# Patient Record
Sex: MALE | Race: WHITE | Employment: OTHER | URBAN - METROPOLITAN AREA
[De-identification: names, ages, dates, MRNs, and addresses within clinical notes are randomized per-mention and may not be internally consistent; named-entity substitution may affect disease eponyms.]

---

## 2017-03-16 ENCOUNTER — OFFICE VISIT (OUTPATIENT)
Dept: PULMONOLOGY | Age: 62
End: 2017-03-16
Payer: COMMERCIAL

## 2017-03-16 VITALS
BODY MASS INDEX: 25.48 KG/M2 | WEIGHT: 172 LBS | DIASTOLIC BLOOD PRESSURE: 80 MMHG | RESPIRATION RATE: 20 BRPM | OXYGEN SATURATION: 98 % | SYSTOLIC BLOOD PRESSURE: 143 MMHG | HEIGHT: 69 IN | TEMPERATURE: 97.1 F | HEART RATE: 85 BPM

## 2017-03-16 DIAGNOSIS — I26.99 OTHER PULMONARY EMBOLISM WITHOUT ACUTE COR PULMONALE, UNSPECIFIED CHRONICITY (HCC): Primary | ICD-10-CM

## 2017-03-16 PROCEDURE — 3017F COLORECTAL CA SCREEN DOC REV: CPT | Performed by: INTERNAL MEDICINE

## 2017-03-16 PROCEDURE — 99214 OFFICE O/P EST MOD 30 MIN: CPT | Performed by: INTERNAL MEDICINE

## 2017-03-16 PROCEDURE — G8419 CALC BMI OUT NRM PARAM NOF/U: HCPCS | Performed by: INTERNAL MEDICINE

## 2017-03-16 PROCEDURE — 1036F TOBACCO NON-USER: CPT | Performed by: INTERNAL MEDICINE

## 2017-03-16 PROCEDURE — G8484 FLU IMMUNIZE NO ADMIN: HCPCS | Performed by: INTERNAL MEDICINE

## 2017-03-16 PROCEDURE — G8427 DOCREV CUR MEDS BY ELIG CLIN: HCPCS | Performed by: INTERNAL MEDICINE

## 2017-09-05 DIAGNOSIS — I26.09 OTHER PULMONARY EMBOLISM WITH ACUTE COR PULMONALE (HCC): ICD-10-CM

## 2018-08-02 ENCOUNTER — OFFICE VISIT (OUTPATIENT)
Dept: ONCOLOGY | Age: 63
End: 2018-08-02
Payer: COMMERCIAL

## 2018-08-02 VITALS
HEART RATE: 87 BPM | WEIGHT: 182 LBS | DIASTOLIC BLOOD PRESSURE: 93 MMHG | BODY MASS INDEX: 26.96 KG/M2 | RESPIRATION RATE: 20 BRPM | HEIGHT: 69 IN | TEMPERATURE: 98.1 F | SYSTOLIC BLOOD PRESSURE: 156 MMHG

## 2018-08-02 DIAGNOSIS — I10 ESSENTIAL HYPERTENSION: ICD-10-CM

## 2018-08-02 DIAGNOSIS — I26.09 OTHER ACUTE PULMONARY EMBOLISM WITH ACUTE COR PULMONALE (HCC): Primary | ICD-10-CM

## 2018-08-02 DIAGNOSIS — I27.20 PULMONARY HTN (HCC): ICD-10-CM

## 2018-08-02 PROCEDURE — G8419 CALC BMI OUT NRM PARAM NOF/U: HCPCS | Performed by: INTERNAL MEDICINE

## 2018-08-02 PROCEDURE — G8427 DOCREV CUR MEDS BY ELIG CLIN: HCPCS | Performed by: INTERNAL MEDICINE

## 2018-08-02 PROCEDURE — 1036F TOBACCO NON-USER: CPT | Performed by: INTERNAL MEDICINE

## 2018-08-02 PROCEDURE — 3017F COLORECTAL CA SCREEN DOC REV: CPT | Performed by: INTERNAL MEDICINE

## 2018-08-02 PROCEDURE — 99214 OFFICE O/P EST MOD 30 MIN: CPT | Performed by: INTERNAL MEDICINE

## 2018-08-02 ASSESSMENT — ENCOUNTER SYMPTOMS
WHEEZING: 0
COUGH: 0
COLOR CHANGE: 0
VOICE CHANGE: 0
SHORTNESS OF BREATH: 0
ABDOMINAL PAIN: 0
EYE ITCHING: 0
EYE REDNESS: 0
CHEST TIGHTNESS: 0
SORE THROAT: 0
TROUBLE SWALLOWING: 0
EYE DISCHARGE: 0
DIARRHEA: 0
CONSTIPATION: 0
BLOOD IN STOOL: 0
NAUSEA: 0
VOMITING: 0

## 2018-08-02 NOTE — LETTER
MTHFR GENE MUTATION ANALYSIS REPORT  Ronald Borrero Ph.D., F.A.C.M.G. - Director, [facility] Sharon Person MD  Specimen(s) Received:  Peripheral blood  Clinical Information:  Other secondary pulmonary hypertension    RESULTS:  MOLECULAR GENETIC DIAGNOSIS:     A. Heterozygous for the MTHFR 677T  Mutation      B. Negative for MTHFR F8851G Mutation    INTERPRETATION:  Using the methodology described, this patient was  found to be a carrier of the MTHFR 677T [c.665C>T(p.[id number])]  mutation. The presence of this mutation in the heterozygous state is  not an independent risk factor associated with increased risk of  hyperhomocysteinemia, venous thromboembolism or ischemic  cardiovascular disease. This patient may, however, still be at risk  for venous thrombosis due to another geneti c predisposition including  the Factor V Leiden mutation or Prothrombin 80780E mutation. Additional molecular testing is available for these mutations in this  laboratory. In addition, other etiologies not studied in this assay  may predispose this patient to venous thrombosis. Genetic counseling is recommended to discuss the implications of this  testing. In addition, the study of other at-risk family members is  recommended. Please contact the [facility] at  [phone]. This molecular test has been approved for in vitro diagnostic use by  the U.S. FDA. This test is used for clinical purposes. Pursuant to  the requirements of CLIA '88, this laboratory has established and  verified the test's accuracy and precision. It should not be regarded  as investigational or for research. This laboratory is certified  under the [address] (CLIA  '88) as qualified to perform high complexity clinical laboratory  testing .     METHODOLOGY: Hyperhomocysteinemia is associated with an increased risk 3. Essential hypertension       Patient with massive pulmonary embolism and deep vein thrombosis of the right lower extremity both of which are extensive. He is  heterozygous positive for MTHFR C677t mutation which in the presence of normal homocystine should not be an independent risk factor for thrombosis. As mentioned earlier he has not had a complete hypercoagulable workup therefore we will do the rest of his hypercoagulable workup and see him back in about 3 weeks and evaluate his risk factor for future thrombosis. PLAN:     Return in about 3 weeks (around 8/23/2018) for hypercoagulable state. Orders Placed This Encounter   Procedures    Lupus Anticoagulant     Standing Status:   Future     Standing Expiration Date:   8/2/2019    Protein C Functional     Standing Status:   Future     Standing Expiration Date:   8/2/2019    Antithrombin III antigen     Standing Status:   Future     Standing Expiration Date:   8/2/2019    Protein S Antigen, Total     Standing Status:   Future     Standing Expiration Date:   8/2/2019    Factor 8 Assay     Standing Status:   Future     Standing Expiration Date:   8/2/2019    Antiphospholipid Antibody Panel     Standing Status:   Future     Standing Expiration Date:   8/2/2019    CBC Auto Differential     Standing Status:   Future     Standing Expiration Date:   8/2/2019    Comprehensive Metabolic Panel     Standing Status:   Future     Standing Expiration Date:   8/2/2019     No orders of the defined types were placed in this encounter. Electronically signed by Elmira Denver, MD on 8/2/2018 at 10:45 AM      If you have questions, please do not hesitate to call me. I look forward to following Indra Stuart along with you.     Sincerely,        Elmira Denver, MD  Phone: [phone]

## 2018-08-02 NOTE — PROGRESS NOTES
increased thermolability occur in  individuals homozygous for the C677T mutation or in individuals  carrying one C677T mutation and one I6348J mutation (compound  heterozygosity). Patient DNA is assayed for the presence of wild type or mutant gene  sequences in the MTHFR gene by the Invader MTHFR test that utilizes  [employer] chemistry for detecting gene-specific sequences. Target  amplification is followed by the signal generation (Invader) phase of  the assay. The Invader  assay employs a genetically engineered  nuclease, known as a Cleavasee enzyme, to recognize and cleave  specific genomic structures formed by the addition of two  oligonucleotide probes [Wild Type (WT) or Mutant (Mut)] to a nucleic  acid target. Fluorescence signal intensity is enhanced through a  second Cleavasee cleavage reaction and fluorescence resonance energy  transfer (FRET), with detection of fluorescent signal using a  fluorescence plate reader. Invader and Cleavase are registered  [facility].  This test is performed pursuant to an  agreement with [address] [place] [address].          **Electronically Signed Out**         Sterling Betancur M.D.         ASSESSMENT:      Diagnosis Orders   1. Other acute pulmonary embolism with acute cor pulmonale (HCC)  Lupus Anticoagulant    Protein C Functional    Antithrombin III antigen    Protein S Antigen, Total    Factor 8 Assay    Antiphospholipid Antibody Panel    CBC Auto Differential    Comprehensive Metabolic Panel   2. Pulmonary HTN     3. Essential hypertension       Patient with massive pulmonary embolism and deep vein thrombosis of the right lower extremity both of which are extensive. He is  heterozygous positive for MTHFR C677t mutation which in the presence of normal homocystine should not be an independent risk factor for thrombosis.   As mentioned earlier he has not had a complete hypercoagulable workup therefore we will do the rest of his hypercoagulable workup and see him

## 2018-08-08 DIAGNOSIS — I26.09 OTHER ACUTE PULMONARY EMBOLISM WITH ACUTE COR PULMONALE (HCC): ICD-10-CM

## 2018-08-27 ENCOUNTER — OFFICE VISIT (OUTPATIENT)
Dept: ONCOLOGY | Age: 63
End: 2018-08-27
Payer: COMMERCIAL

## 2018-08-27 VITALS
TEMPERATURE: 97.8 F | RESPIRATION RATE: 18 BRPM | DIASTOLIC BLOOD PRESSURE: 90 MMHG | HEIGHT: 69 IN | WEIGHT: 181.8 LBS | SYSTOLIC BLOOD PRESSURE: 145 MMHG | BODY MASS INDEX: 26.93 KG/M2 | HEART RATE: 80 BPM

## 2018-08-27 DIAGNOSIS — I26.09 OTHER ACUTE PULMONARY EMBOLISM WITH ACUTE COR PULMONALE (HCC): Primary | ICD-10-CM

## 2018-08-27 PROCEDURE — G8419 CALC BMI OUT NRM PARAM NOF/U: HCPCS | Performed by: INTERNAL MEDICINE

## 2018-08-27 PROCEDURE — 1036F TOBACCO NON-USER: CPT | Performed by: INTERNAL MEDICINE

## 2018-08-27 PROCEDURE — G8427 DOCREV CUR MEDS BY ELIG CLIN: HCPCS | Performed by: INTERNAL MEDICINE

## 2018-08-27 PROCEDURE — 99214 OFFICE O/P EST MOD 30 MIN: CPT | Performed by: INTERNAL MEDICINE

## 2018-08-27 PROCEDURE — 3017F COLORECTAL CA SCREEN DOC REV: CPT | Performed by: INTERNAL MEDICINE

## 2018-08-27 ASSESSMENT — ENCOUNTER SYMPTOMS
SHORTNESS OF BREATH: 0
COUGH: 0
VOICE CHANGE: 0
CONSTIPATION: 0
ABDOMINAL PAIN: 0
VOMITING: 0
NAUSEA: 0
WHEEZING: 0
EYE ITCHING: 0
BLOOD IN STOOL: 0
SORE THROAT: 0
CHEST TIGHTNESS: 0
EYE REDNESS: 0
EYE DISCHARGE: 0
COLOR CHANGE: 0
DIARRHEA: 0
TROUBLE SWALLOWING: 0

## 2018-08-27 NOTE — PROGRESS NOTES
[facility]  [facility]  [address] [address]  Dept: [phone]  Dept Fax: [phone]  Marya Lacey is a 60 y.o. male who presents today for follow up of his   Chief Complaint   Patient presents with    Other     pulmonary embolism    Other     DVT         HPI 29-year-old man  diagnosed with a large pulmonary embolism and DVT of the right leg In 2016. 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 .  The next day he woke up and had a syncopal episode which lasted about 1-2 minutes. He was transferred to [facility] emergency room and was thought to have an acute MI. He was then transferred to [facility] where a CAT scan of the chest showed a large pulmonary embolism with near occlusion of the right pulmonary artery at the bifurcation with smaller thrombus in the upper and lower branches of the left pulmonary artery. CT scan also showed right atrial strain. He was treated with Lovenox 1 mg/kg subcutaneously twice a day. Venous scans of both lower extremities showed acute and extensive DVT of his right leg. Patient says that he has been having some leg pain about a week prior to the episode but thought it was due to his knee injury that he had in the past and did not include much of it. While at [facility] he was also found to be hypertensive and was placed on Norvasc 5 mg daily. He had a limited hypercoagulable workup done including factor V Leiden mutation and anticardiolipin antibody along with prothrombin mutations which were all negative. He was referred to me for further hypercoagulable workup although he has been told that he will be on anticoagulation for the rest of his life.   Patient was on Eliquis up until May of this year  and states that he ran out of prescription and stopped taking it.  Further testing shows that he is heterozygous positive for MTHFR C677t mutation. His anticardiolipin antibody is negative. His homocysteine level is also normal.  Rest of his hypercoagulable workup including protein S antigen, antithrombin III antigen and factor VIII assay is abnormal.  He is slightly low protein C activity. He has restarted taking the Eliquis when he realized he had another refill on that. Current Outpatient Prescriptions   Medication Sig Dispense Refill    atorvastatin (LIPITOR) 20 MG tablet Take 20 mg by mouth daily      amLODIPine (NORVASC) 5 MG tablet Take 1 tablet by mouth daily 30 tablet 3    apixaban (ELIQUIS) 5 MG TABS tablet Take 1 tablet by mouth 2 times daily 60 tablet 3     No current facility-administered medications for this visit. No Known Allergies    Past Medical History:   Diagnosis Date    Pulmonary embolism (HCC)     Skin cancer of nose     Basal Cell        Past Surgical History:   Procedure Laterality Date    APPENDECTOMY      KNEE ARTHROSCOPY      right       History reviewed. No pertinent family history. Social History   Substance Use Topics    Smoking status: Never Smoker    Smokeless tobacco: Never Used    Alcohol use Yes      Comment: occ - social          The Past Medical History, Past Surgical History, Past Family History and Past Social History have been reviewed      Review of Systems   Constitutional: Negative for activity change, appetite change, chills, fatigue and fever. HENT: Negative for congestion, hearing loss, mouth sores, nosebleeds, sore throat, tinnitus, trouble swallowing and voice change. Eyes: Negative for discharge, redness, itching and visual disturbance. Respiratory: Negative for cough, chest tightness, shortness of breath and wheezing. Cardiovascular: Negative for chest pain and leg swelling. Gastrointestinal: Negative for abdominal pain, blood in stool, constipation, diarrhea, nausea and vomiting. Genitourinary: Negative for decreased urine volume, difficulty urinating, hematuria and urgency. Musculoskeletal: Negative for arthralgias, joint swelling and myalgias. Skin: Negative for color change, pallor and rash. Neurological: Negative for dizziness, weakness, light-headedness, numbness and headaches. Hematological: Negative for adenopathy. Does not bruise/bleed easily. Psychiatric/Behavioral: Negative for behavioral problems and confusion. Physical Exam   Constitutional: He is oriented to person, place, and time. He appears well-developed and well-nourished. No distress. HENT:   Head: Normocephalic. Eyes: Pupils are equal, round, and reactive to light. No scleral icterus. Neck: Neck supple. No thyromegaly present. Cardiovascular: Normal rate and regular rhythm. No murmur heard. Pulmonary/Chest: Effort normal and breath sounds normal. No respiratory distress. He has no wheezes. He has no rales. Abdominal: Soft. He exhibits no mass. There is no hepatosplenomegaly. There is no tenderness. Musculoskeletal: He exhibits no edema or tenderness. Lymphadenopathy:     He has no cervical adenopathy. He has no axillary adenopathy. Neurological: He is alert and oriented to person, place, and time. No cranial nerve deficit. Skin: Skin is warm and dry. No cyanosis. Nails show no clubbing. Psychiatric: He has a normal mood and affect. His behavior is normal. Thought content normal.   Nursing note and vitals reviewed. Results for orders placed or performed during the hospital encounter of 11/23/16   MTHFR Mutation 677T/[id number]   Result Value Ref Range    MTHFR Mutation 677T/[id number]       (NOTE)  [address] [facility]  MOLECULAR PATHOLOGY LABORATORY  [address] [place] [address], [address]  Tel [phone]  MTHFR GENE MUTATION ANALYSIS REPORT  Patricio Nuñez Ph.D., F.A.C.M.G. - Director, Ravi Diaz MD Geetha Vanegas MD  Specimen(s) Received:  Peripheral blood  Clinical Information:  Other secondary pulmonary hypertension    RESULTS:  MOLECULAR GENETIC DIAGNOSIS:     A. Heterozygous for the MTHFR 677T  Mutation      B. Negative for MTHFR V0380Y Mutation    INTERPRETATION:  Using the methodology described, this patient was  found to be a carrier of the MTHFR 677T [c.665C>T(p.Lsx806Vve)]  mutation. The presence of this mutation in the heterozygous state is  not an independent risk factor associated with increased risk of  hyperhomocysteinemia, venous thromboembolism or ischemic  cardiovascular disease. This patient may, however, still be at risk  for venous thrombosis due to another geneti c predisposition including  the Factor V Leiden mutation or Prothrombin 75179C mutation. Additional molecular testing is available for these mutations in this  laboratory. In addition, other etiologies not studied in this assay  may predispose this patient to venous thrombosis. Genetic counseling is recommended to discuss the implications of this  testing. In addition, the study of other at-risk family members is  recommended. Please contact the [facility] [email or web address] [address] at  [phone]. This molecular test has been approved for in vitro diagnostic use by  the U.S. FDA. This test is used for clinical purposes. Pursuant to  the requirements of CLIA '88, this laboratory has established and  verified the test's accuracy and precision. It should not be regarded  as investigational or for research. This laboratory is certified  under the [address] (CLIA  '88) as qualified to perform high complexity clinical laboratory  testing . METHODOLOGY: Hyperhomocysteinemia is associated with an increased risk  for cerebrovascular, peripheral vascular and coronary heart disease.    The 5, 10-methylenetetrahydrofolate reductase (MTHFR) gene produces an  enzyme that catalyzes has had an extensive pulmonary embolism along with the vein thromboses of the lower extremity I think he should be on anticoagulation for life. We will see him back again in 6 months . PLAN:     Return in about 6 months (around 2/27/2019) for hypercoagulable state. Orders Placed This Encounter   Procedures    CBC Auto Differential     Standing Status:   Future     Standing Expiration Date:   8/27/2019    Comprehensive Metabolic Panel     Standing Status:   Future     Standing Expiration Date:   8/27/2019     No orders of the defined types were placed in this encounter.           Electronically signed by Radha Andrade MD on 8/27/2018 at 12:03 PM

## 2018-08-27 NOTE — LETTER
Eyes: Negative for discharge, redness, itching and visual disturbance. Respiratory: Negative for cough, chest tightness, shortness of breath and wheezing. Cardiovascular: Negative for chest pain and leg swelling. Gastrointestinal: Negative for abdominal pain, blood in stool, constipation, diarrhea, nausea and vomiting. Genitourinary: Negative for decreased urine volume, difficulty urinating, hematuria and urgency. Musculoskeletal: Negative for arthralgias, joint swelling and myalgias. Skin: Negative for color change, pallor and rash. Neurological: Negative for dizziness, weakness, light-headedness, numbness and headaches. Hematological: Negative for adenopathy. Does not bruise/bleed easily. Psychiatric/Behavioral: Negative for behavioral problems and confusion. Physical Exam   Constitutional: He is oriented to person, place, and time. He appears well-developed and well-nourished. No distress. HENT:   Head: Normocephalic. Eyes: Pupils are equal, round, and reactive to light. No scleral icterus. Neck: Neck supple. No thyromegaly present. Cardiovascular: Normal rate and regular rhythm. No murmur heard. Pulmonary/Chest: Effort normal and breath sounds normal. No respiratory distress. He has no wheezes. He has no rales. Abdominal: Soft. He exhibits no mass. There is no hepatosplenomegaly. There is no tenderness. Musculoskeletal: He exhibits no edema or tenderness. Lymphadenopathy:     He has no cervical adenopathy. He has no axillary adenopathy. Neurological: He is alert and oriented to person, place, and time. No cranial nerve deficit. Skin: Skin is warm and dry. No cyanosis. Nails show no clubbing. Psychiatric: He has a normal mood and affect. His behavior is normal. Thought content normal.   Nursing note and vitals reviewed.     Results for orders placed or performed during the hospital encounter of 11/23/16   MTHFR Mutation 677T/[id number]   Result Value Ref Range

## 2019-05-16 ENCOUNTER — HOSPITAL ENCOUNTER (OUTPATIENT)
Age: 64
Discharge: HOME | End: 2019-05-16
Payer: COMMERCIAL

## 2019-05-16 LAB
ABSOLUTE EOS #: 0.11 K/UL (ref 0–0.44)
ABSOLUTE IMMATURE GRANULOCYTE: 0.03 K/UL (ref 0–0.3)
ABSOLUTE LYMPH #: 2.69 K/UL (ref 1.1–3.7)
ABSOLUTE MONO #: 0.43 K/UL (ref 0.1–1.2)
ALBUMIN SERPL-MCNC: 4.3 G/DL (ref 3.5–5.2)
ALBUMIN/GLOBULIN RATIO: 1.3 (ref 1–2.5)
ALP BLD-CCNC: 101 U/L (ref 40–129)
ALT SERPL-CCNC: 28 U/L (ref 5–41)
ANION GAP SERPL CALCULATED.3IONS-SCNC: 12 MMOL/L (ref 9–17)
AST SERPL-CCNC: 25 U/L
BASOPHILS # BLD: 0 % (ref 0–2)
BASOPHILS ABSOLUTE: 0.03 K/UL (ref 0–0.2)
BILIRUB SERPL-MCNC: 1.05 MG/DL (ref 0.3–1.2)
BUN BLDV-MCNC: 17 MG/DL (ref 8–23)
BUN/CREAT BLD: 17 (ref 9–20)
CALCIUM SERPL-MCNC: 9.4 MG/DL (ref 8.6–10.4)
CHLORIDE BLD-SCNC: 103 MMOL/L (ref 98–107)
CHOLESTEROL/HDL RATIO: 2.9
CHOLESTEROL: 171 MG/DL
CO2: 26 MMOL/L (ref 20–31)
CREAT SERPL-MCNC: 0.98 MG/DL (ref 0.7–1.2)
DIFFERENTIAL TYPE: NORMAL
EOSINOPHILS RELATIVE PERCENT: 2 % (ref 1–4)
GFR AFRICAN AMERICAN: >60 ML/MIN
GFR NON-AFRICAN AMERICAN: >60 ML/MIN
GFR SERPL CREATININE-BSD FRML MDRD: NORMAL ML/MIN/{1.73_M2}
GFR SERPL CREATININE-BSD FRML MDRD: NORMAL ML/MIN/{1.73_M2}
GLUCOSE BLD-MCNC: 94 MG/DL (ref 70–99)
HCT VFR BLD CALC: 49.3 % (ref 40.7–50.3)
HDLC SERPL-MCNC: 58 MG/DL
HEMOGLOBIN: 16 G/DL (ref 13–17)
IMMATURE GRANULOCYTES: 0 %
LDL CHOLESTEROL: 93 MG/DL (ref 0–130)
LYMPHOCYTES # BLD: 39 % (ref 24–43)
MCH RBC QN AUTO: 29.4 PG (ref 25.2–33.5)
MCHC RBC AUTO-ENTMCNC: 32.5 G/DL (ref 28.4–34.8)
MCV RBC AUTO: 90.6 FL (ref 82.6–102.9)
MONOCYTES # BLD: 6 % (ref 3–12)
NRBC AUTOMATED: 0 PER 100 WBC
PDW BLD-RTO: 12.4 % (ref 11.8–14.4)
PLATELET # BLD: 185 K/UL (ref 138–453)
PLATELET ESTIMATE: NORMAL
PMV BLD AUTO: 9.2 FL (ref 8.1–13.5)
POTASSIUM SERPL-SCNC: 4.4 MMOL/L (ref 3.7–5.3)
RBC # BLD: 5.44 M/UL (ref 4.21–5.77)
RBC # BLD: NORMAL 10*6/UL
SEG NEUTROPHILS: 53 % (ref 36–65)
SEGMENTED NEUTROPHILS ABSOLUTE COUNT: 3.68 K/UL (ref 1.5–8.1)
SODIUM BLD-SCNC: 141 MMOL/L (ref 135–144)
TOTAL PROTEIN: 7.7 G/DL (ref 6.4–8.3)
TRIGL SERPL-MCNC: 102 MG/DL
VLDLC SERPL CALC-MCNC: NORMAL MG/DL (ref 1–30)
WBC # BLD: 7 K/UL (ref 3.5–11.3)
WBC # BLD: NORMAL 10*3/UL

## 2019-05-16 PROCEDURE — 85025 COMPLETE CBC W/AUTO DIFF WBC: CPT

## 2019-05-16 PROCEDURE — 80053 COMPREHEN METABOLIC PANEL: CPT

## 2019-05-16 PROCEDURE — 36415 COLL VENOUS BLD VENIPUNCTURE: CPT

## 2019-05-16 PROCEDURE — 80061 LIPID PANEL: CPT

## 2019-09-26 ENCOUNTER — HOSPITAL ENCOUNTER (OUTPATIENT)
Age: 64
Discharge: HOME | End: 2019-09-26
Payer: COMMERCIAL

## 2019-09-26 LAB
ABSOLUTE EOS #: 0.06 K/UL (ref 0–0.44)
ABSOLUTE IMMATURE GRANULOCYTE: <0.03 K/UL (ref 0–0.3)
ABSOLUTE LYMPH #: 2.1 K/UL (ref 1.1–3.7)
ABSOLUTE MONO #: 0.45 K/UL (ref 0.1–1.2)
ALBUMIN SERPL-MCNC: 4.2 G/DL (ref 3.5–5.2)
ALBUMIN/GLOBULIN RATIO: 1.3 (ref 1–2.5)
ALP BLD-CCNC: 99 U/L (ref 40–129)
ALT SERPL-CCNC: 24 U/L (ref 5–41)
ANION GAP SERPL CALCULATED.3IONS-SCNC: 14 MMOL/L (ref 9–17)
AST SERPL-CCNC: 26 U/L
BASOPHILS # BLD: 0 % (ref 0–2)
BASOPHILS ABSOLUTE: <0.03 K/UL (ref 0–0.2)
BILIRUB SERPL-MCNC: 0.95 MG/DL (ref 0.3–1.2)
BUN BLDV-MCNC: 19 MG/DL (ref 8–23)
BUN/CREAT BLD: 20 (ref 9–20)
CALCIUM SERPL-MCNC: 9.8 MG/DL (ref 8.6–10.4)
CHLORIDE BLD-SCNC: 101 MMOL/L (ref 98–107)
CHOLESTEROL/HDL RATIO: 3.1
CHOLESTEROL: 169 MG/DL
CO2: 24 MMOL/L (ref 20–31)
CREAT SERPL-MCNC: 0.95 MG/DL (ref 0.7–1.2)
DIFFERENTIAL TYPE: NORMAL
EOSINOPHILS RELATIVE PERCENT: 1 % (ref 1–4)
GFR AFRICAN AMERICAN: >60 ML/MIN
GFR NON-AFRICAN AMERICAN: >60 ML/MIN
GFR SERPL CREATININE-BSD FRML MDRD: NORMAL ML/MIN/{1.73_M2}
GFR SERPL CREATININE-BSD FRML MDRD: NORMAL ML/MIN/{1.73_M2}
GLUCOSE BLD-MCNC: 85 MG/DL (ref 70–99)
HCT VFR BLD CALC: 47.5 % (ref 40.7–50.3)
HDLC SERPL-MCNC: 55 MG/DL
HEMOGLOBIN: 15.3 G/DL (ref 13–17)
IMMATURE GRANULOCYTES: 0 %
LDL CHOLESTEROL: 99 MG/DL (ref 0–130)
LYMPHOCYTES # BLD: 33 % (ref 24–43)
MCH RBC QN AUTO: 29.4 PG (ref 25.2–33.5)
MCHC RBC AUTO-ENTMCNC: 32.2 G/DL (ref 28.4–34.8)
MCV RBC AUTO: 91.3 FL (ref 82.6–102.9)
MONOCYTES # BLD: 7 % (ref 3–12)
NRBC AUTOMATED: 0 PER 100 WBC
PDW BLD-RTO: 12 % (ref 11.8–14.4)
PLATELET # BLD: 159 K/UL (ref 138–453)
PLATELET ESTIMATE: NORMAL
PMV BLD AUTO: 9.3 FL (ref 8.1–13.5)
POTASSIUM SERPL-SCNC: 5 MMOL/L (ref 3.7–5.3)
PROSTATE SPECIFIC ANTIGEN: 3.12 UG/L
RBC # BLD: 5.2 M/UL (ref 4.21–5.77)
RBC # BLD: NORMAL 10*6/UL
SEG NEUTROPHILS: 59 % (ref 36–65)
SEGMENTED NEUTROPHILS ABSOLUTE COUNT: 3.76 K/UL (ref 1.5–8.1)
SODIUM BLD-SCNC: 139 MMOL/L (ref 135–144)
TOTAL PROTEIN: 7.5 G/DL (ref 6.4–8.3)
TRIGL SERPL-MCNC: 75 MG/DL
VLDLC SERPL CALC-MCNC: NORMAL MG/DL (ref 1–30)
WBC # BLD: 6.4 K/UL (ref 3.5–11.3)
WBC # BLD: NORMAL 10*3/UL

## 2019-09-26 PROCEDURE — G0103 PSA SCREENING: HCPCS

## 2019-09-26 PROCEDURE — 80053 COMPREHEN METABOLIC PANEL: CPT

## 2019-09-26 PROCEDURE — 80061 LIPID PANEL: CPT

## 2019-09-26 PROCEDURE — 36415 COLL VENOUS BLD VENIPUNCTURE: CPT

## 2019-09-26 PROCEDURE — 85025 COMPLETE CBC W/AUTO DIFF WBC: CPT

## 2020-06-24 ENCOUNTER — OFFICE VISIT (OUTPATIENT)
Dept: UROLOGY | Age: 65
End: 2020-06-24
Payer: MEDICARE

## 2020-06-24 ENCOUNTER — HOSPITAL ENCOUNTER (OUTPATIENT)
Age: 65
Setting detail: SPECIMEN
Discharge: HOME | End: 2020-06-24
Payer: MEDICARE

## 2020-06-24 VITALS
SYSTOLIC BLOOD PRESSURE: 170 MMHG | WEIGHT: 181 LBS | HEART RATE: 103 BPM | TEMPERATURE: 96.8 F | BODY MASS INDEX: 26.81 KG/M2 | HEIGHT: 69 IN | DIASTOLIC BLOOD PRESSURE: 92 MMHG

## 2020-06-24 PROBLEM — R97.20 ELEVATED PSA: Status: ACTIVE | Noted: 2020-06-24

## 2020-06-24 LAB
-: ABNORMAL
AMORPHOUS: ABNORMAL
BACTERIA: ABNORMAL
BILIRUBIN URINE: NEGATIVE
CASTS UA: ABNORMAL /LPF
COLOR: YELLOW
COMMENT UA: ABNORMAL
CRYSTALS, UA: ABNORMAL /HPF
EPITHELIAL CELLS UA: ABNORMAL /HPF (ref 0–5)
GLUCOSE URINE: ABNORMAL
KETONES, URINE: NEGATIVE
LEUKOCYTE ESTERASE, URINE: NEGATIVE
MUCUS: ABNORMAL
NITRITE, URINE: NEGATIVE
OTHER OBSERVATIONS UA: ABNORMAL
PH UA: 6 (ref 5–9)
PROTEIN UA: NEGATIVE
RBC UA: ABNORMAL /HPF (ref 0–2)
RENAL EPITHELIAL, UA: ABNORMAL /HPF
SPECIFIC GRAVITY UA: 1.02 (ref 1.01–1.02)
TRICHOMONAS: ABNORMAL
TURBIDITY: CLEAR
URINE HGB: ABNORMAL
UROBILINOGEN, URINE: NORMAL
WBC UA: ABNORMAL /HPF (ref 0–5)
YEAST: ABNORMAL

## 2020-06-24 PROCEDURE — 4040F PNEUMOC VAC/ADMIN/RCVD: CPT | Performed by: NURSE PRACTITIONER

## 2020-06-24 PROCEDURE — G8427 DOCREV CUR MEDS BY ELIG CLIN: HCPCS | Performed by: NURSE PRACTITIONER

## 2020-06-24 PROCEDURE — 99204 OFFICE O/P NEW MOD 45 MIN: CPT | Performed by: NURSE PRACTITIONER

## 2020-06-24 PROCEDURE — 99211 OFF/OP EST MAY X REQ PHY/QHP: CPT | Performed by: NURSE PRACTITIONER

## 2020-06-24 PROCEDURE — 1036F TOBACCO NON-USER: CPT | Performed by: NURSE PRACTITIONER

## 2020-06-24 PROCEDURE — 81001 URINALYSIS AUTO W/SCOPE: CPT

## 2020-06-24 PROCEDURE — 3017F COLORECTAL CA SCREEN DOC REV: CPT | Performed by: NURSE PRACTITIONER

## 2020-06-24 PROCEDURE — 1123F ACP DISCUSS/DSCN MKR DOCD: CPT | Performed by: NURSE PRACTITIONER

## 2020-06-24 PROCEDURE — G8417 CALC BMI ABV UP PARAM F/U: HCPCS | Performed by: NURSE PRACTITIONER

## 2020-06-24 PROCEDURE — 87086 URINE CULTURE/COLONY COUNT: CPT

## 2020-06-24 RX ORDER — LEVOFLOXACIN 500 MG/1
500 TABLET, FILM COATED ORAL DAILY
Qty: 10 TABLET | Refills: 0 | Status: SHIPPED | OUTPATIENT
Start: 2020-06-24 | End: 2020-07-04

## 2020-06-24 ASSESSMENT — ENCOUNTER SYMPTOMS
VOMITING: 0
WHEEZING: 0
BACK PAIN: 0
COLOR CHANGE: 0
EYE REDNESS: 0
EYE PAIN: 0
SHORTNESS OF BREATH: 0
CONSTIPATION: 0
NAUSEA: 0
COUGH: 0
ABDOMINAL PAIN: 0

## 2020-06-24 NOTE — PATIENT INSTRUCTIONS
SURVEY:    You may be receiving a survey from [employer] regarding your visit today. Please complete the survey to enable us to provide the highest quality of care to you and your family. If you cannot score us a very good on any question, please call the office to discuss how we could of made your experience a very good one. Thank you.

## 2020-06-24 NOTE — PROGRESS NOTES
Random bladderscan performed in office today:  Pt last voided 90 mins ago, scan = 94 mL
Urinalysis with Microscopic           PLAN:  We we will check a UA C&S. He will take Levaquin for 10 days, then repeat the PSA in 4 to 6 weeks. He will follow-up in the office to discuss results. If his PSA does decline he will be due for a rectal exam at this visit. If his PSA remains elevated we will need to discuss a prostate biopsy.

## 2020-06-25 LAB
CULTURE: NO GROWTH
Lab: NORMAL
SPECIMEN DESCRIPTION: NORMAL

## 2020-06-26 ENCOUNTER — TELEPHONE (OUTPATIENT)
Dept: UROLOGY | Age: 65
End: 2020-06-26

## 2020-08-10 LAB — PSA, ULTRASENSITIVE: 2.84 NG/ML (ref 0–4)

## 2020-08-12 NOTE — PROGRESS NOTES
HPI:    Patient is a 71 y.o. male in no acute distress. He is alert and oriented to person, place, and time. History  New patient referral from Dr. Yasmany Hartley for elevated PSA of 4.46. PSA from 4/2019 was 3.12. Patient denies any family history of prostate cancer, breast cancer, or ovarian cancer. He denies unintentional weight loss, decreased energy or appetite, new or worsening bone/hip/back pain. He denies any lower extremity numbness and tingling. He denies new or worsening LUTS. He does have nocturia x2 and a weak stream. PVR 0ml. He denies gross hematuria or dysuria.      PSA  8/2020 - 2.84  5/2020 - 4.46  4/2019 - 3.12  8/2018 - 2.88  7/2017 - 2.67      Currently  Patient is here today for 6-week follow-up. Patient did consume a 10-day course of Levaquin. He did have a repeat PSA. His current value was 2.84. This is down from his previous value of 4.46. Patient did take his entire course of antibiotics. He did tolerate this well. He has no new or worsening lower urinary tract symptoms. No flank pain nausea vomiting. No unintentional weight loss or decreased appetite. Past Medical History:   Diagnosis Date    Pulmonary embolism (HCC)     Skin cancer of nose     Basal Cell     Past Surgical History:   Procedure Laterality Date    APPENDECTOMY      KNEE ARTHROSCOPY      right     Outpatient Encounter Medications as of 8/13/2020   Medication Sig Dispense Refill    atorvastatin (LIPITOR) 20 MG tablet Take 20 mg by mouth daily      amLODIPine (NORVASC) 5 MG tablet Take 1 tablet by mouth daily 30 tablet 3    apixaban (ELIQUIS) 5 MG TABS tablet Take 1 tablet by mouth 2 times daily 60 tablet 3     No facility-administered encounter medications on file as of 8/13/2020.        Current Outpatient Medications on File Prior to Visit   Medication Sig Dispense Refill    atorvastatin (LIPITOR) 20 MG tablet Take 20 mg by mouth daily      amLODIPine (NORVASC) 5 MG tablet Take 1 tablet by mouth daily 30 tablet 3    apixaban (ELIQUIS) 5 MG TABS tablet Take 1 tablet by mouth 2 times daily 60 tablet 3     No current facility-administered medications on file prior to visit. Patient has no known allergies. No family history on file. Social History     Tobacco Use   Smoking Status Never Smoker   Smokeless Tobacco Never Used       Social History     Substance and Sexual Activity   Alcohol Use Yes    Comment: occ - social       Review of Systems    There were no vitals taken for this visit. PHYSICAL EXAM:  Constitutional: Patient in no acute distress; Neuro: alert and oriented to person place and time. Psych: Mood and affect normal.  Skin: Normal  Lungs: Respiratory effort normal  Cardiovascular:  Normal peripheral pulses  Abdomen: Soft, non-tender, non-distended with no CVA, flank pain, hepatosplenomegaly or hernia. Kidneys normal.  Bladder non-tender and not distended. Lymphatics: no palpable lymphadenopathy  Penis normal  Urethral meatus normal  Scrotal exam normal  Testicles normal bilaterally  Epididymis normal bilaterally  No evidence of inguinal hernia      Lab Results   Component Value Date    BUN 19 09/26/2019     Lab Results   Component Value Date    CREATININE 0.95 09/26/2019     Lab Results   Component Value Date    PSA 3.12 09/26/2019       ASSESSMENT:  This is a 71 y.o. male with the following diagnoses:   Diagnosis Orders   1. Elevated PSA  PSA, Diagnostic   2. BPH with obstruction/lower urinary tract symptoms           PLAN:  We will plan for repeat PSA in 6 months.

## 2020-08-13 ENCOUNTER — OFFICE VISIT (OUTPATIENT)
Dept: UROLOGY | Age: 65
End: 2020-08-13
Payer: MEDICARE

## 2020-08-13 VITALS
WEIGHT: 180 LBS | HEIGHT: 69 IN | DIASTOLIC BLOOD PRESSURE: 90 MMHG | TEMPERATURE: 99.1 F | SYSTOLIC BLOOD PRESSURE: 168 MMHG | BODY MASS INDEX: 26.66 KG/M2

## 2020-08-13 PROCEDURE — 1036F TOBACCO NON-USER: CPT | Performed by: UROLOGY

## 2020-08-13 PROCEDURE — 4040F PNEUMOC VAC/ADMIN/RCVD: CPT | Performed by: UROLOGY

## 2020-08-13 PROCEDURE — G8427 DOCREV CUR MEDS BY ELIG CLIN: HCPCS | Performed by: UROLOGY

## 2020-08-13 PROCEDURE — 99213 OFFICE O/P EST LOW 20 MIN: CPT | Performed by: UROLOGY

## 2020-08-13 PROCEDURE — G8417 CALC BMI ABV UP PARAM F/U: HCPCS | Performed by: UROLOGY

## 2020-08-13 PROCEDURE — 3017F COLORECTAL CA SCREEN DOC REV: CPT | Performed by: UROLOGY

## 2020-08-13 PROCEDURE — 1123F ACP DISCUSS/DSCN MKR DOCD: CPT | Performed by: UROLOGY

## 2020-08-13 PROCEDURE — 99211 OFF/OP EST MAY X REQ PHY/QHP: CPT

## 2020-09-30 ENCOUNTER — HOSPITAL ENCOUNTER (OUTPATIENT)
Dept: VASCULAR LAB | Age: 65
Discharge: HOME | End: 2020-10-02
Payer: MEDICARE

## 2020-09-30 PROCEDURE — 93880 EXTRACRANIAL BILAT STUDY: CPT

## 2020-12-16 ENCOUNTER — OFFICE VISIT (OUTPATIENT)
Dept: SURGERY | Age: 65
End: 2020-12-16
Payer: MEDICARE

## 2020-12-16 VITALS
HEIGHT: 69 IN | HEART RATE: 99 BPM | BODY MASS INDEX: 27.08 KG/M2 | TEMPERATURE: 98.8 F | SYSTOLIC BLOOD PRESSURE: 179 MMHG | RESPIRATION RATE: 20 BRPM | DIASTOLIC BLOOD PRESSURE: 99 MMHG | WEIGHT: 182.8 LBS

## 2020-12-16 PROCEDURE — 99211 OFF/OP EST MAY X REQ PHY/QHP: CPT | Performed by: SURGERY

## 2020-12-16 PROCEDURE — 99999 PR OFFICE/OUTPT VISIT,PROCEDURE ONLY: CPT | Performed by: SURGERY

## 2020-12-16 NOTE — PROGRESS NOTES
GENERAL SURGERY CONSULTATION / OFFICE VISIT      Patient's Name/ Date of Birth/ Gender: Jailene Powell / 1955 (76 y.o.) / male     PCP: Efren Grimes MD    History of present Illness:  Patient is a pleasant 76 y.o. male  kindly referred by Dr. Ana Muller. 77 yo. No prior colonoscopy. Denies rectal bleeding. No changes in bowel habits. No first degree relatives with colorectal cancer or inflammatory bowel disease. Denies weight loss or abdominal pain. Denies nausea or vomiting. On Eliquis for life for unprovoked DVT/PE several years ago with fatigue several days onset before passing out, he discussed the details of the history. Dr. Wood Bradley notes reviewed. saw Dr. Ozzy Gallegos for formal hematology evaluation and workup. Past Medical History:  has a past medical history of History of DVT (deep vein thrombosis), Pulmonary embolism ([address] and Skin cancer of nose. Past Surgical History:   Past Surgical History:   Procedure Laterality Date    APPENDECTOMY      KNEE ARTHROSCOPY      right       Social History:  reports that he has never smoked. He has never used smokeless tobacco. He reports current alcohol use. He reports that he does not use drugs. Family History: family history includes No Known Problems in his father. Review of Systems:   General: Denies fever, chills, night sweats, weight loss, malaise, fatigue  HEENT: Denies sore throat, sinus problems, allergic rhinosinusitis  Card: Denies chest pain, palpitations, orthopnea/PND. HTN. Pulm: Denies cough, shortness of breath, dyspnea on exertion. Hx massive PE 2016  GI:  per HPI. : Denies polyuria, dysuria, hematuria  Endo: neg  Heme: unprovoked DVT and saddle emb PE  Psych: neg  Neuro: Denies h/o CVA, TIA  Skin: Denies rashes, ulcers. Skin ca nose  Musculoskeletal: no gross motor dysfunction. OA    Allergies: Patient has no known allergies.     Current Meds:  Current Outpatient Medications:   atorvastatin (LIPITOR) 20 MG tablet, Take 20 mg by mouth daily, Disp: , Rfl:     amLODIPine (NORVASC) 5 MG tablet, Take 1 tablet by mouth daily, Disp: 30 tablet, Rfl: 3    apixaban (ELIQUIS) 5 MG TABS tablet, Take 1 tablet by mouth 2 times daily, Disp: 60 tablet, Rfl: 3    Physical Exam:  Vital signs and Nurse's note reviewed  Gen:  A&Ox3, NAD. Pleasant and cooperative. HEENT: PERRLA, EOMI, no scleral icterus  Neck:  no goiter  CVS: Regular rate and rhythm  Resp: Good bilateral air entry, no active wheezing, no labored breathing  Abd: soft, non-tender, non-distended, rectal exam to be done at time of scope. Prior appendectomy. Ext: Moves all extremities, no gross focal motor deficits  Skin: No erythema or ulcerations     Labs:   Lab Results   Component Value Date    WBC 6.4 09/26/2019    HGB 15.3 09/26/2019    HCT 47.5 09/26/2019    MCV 91.3 09/26/2019     09/26/2019     Lab Results   Component Value Date     09/26/2019    K 5.0 09/26/2019     09/26/2019    CO2 24 09/26/2019    BUN 19 09/26/2019    CREATININE 0.95 09/26/2019    GLUCOSE 85 09/26/2019    CALCIUM 9.8 09/26/2019     Lab Results   Component Value Date    ALKPHOS 99 09/26/2019    ALT 24 09/26/2019    AST 26 09/26/2019    PROT 7.5 09/26/2019    BILITOT 0.95 09/26/2019    LABALBU 4.2 09/26/2019     Lab Results   Component Value Date    LACTA 1.1 11/08/2016       Impressions/Recommendations:     Overdue screening colonoscopy. Hold Eliquis 3 days in case  Biopsies/polypectomy needed. Will request clearance. Risks and benefits of colonoscopy have been discussed in detail with the patient. Risks of the procedure include bleeding, bowel perforation, possibly missing smaller lesions if the bowel prep is not adequate. Alternative studies include barium enema and virtual colonoscopy; however, if a lesion is seen, then one would still need to proceed with the gold standard colonoscopy to retrieve or biopsy the lesion. All the patient's questions are answered. Will proceed with colonoscopy under [facility]. Thank you Steve Hdz MD for allowing me to participate in the care of this nice gentlemna.     Ori Hill DO, MPH, [address] [place] [address] office [phone]  [place] office [phone]

## 2021-02-09 LAB — PSA, ULTRASENSITIVE: 3.33 NG/ML (ref 0–4)

## 2021-02-15 ENCOUNTER — OFFICE VISIT (OUTPATIENT)
Dept: UROLOGY | Age: 66
End: 2021-02-15
Payer: MEDICARE

## 2021-02-15 VITALS
DIASTOLIC BLOOD PRESSURE: 88 MMHG | SYSTOLIC BLOOD PRESSURE: 178 MMHG | WEIGHT: 187 LBS | BODY MASS INDEX: 27.62 KG/M2 | TEMPERATURE: 97.5 F

## 2021-02-15 DIAGNOSIS — R97.20 ELEVATED PSA: Primary | ICD-10-CM

## 2021-02-15 DIAGNOSIS — N13.8 BPH WITH OBSTRUCTION/LOWER URINARY TRACT SYMPTOMS: ICD-10-CM

## 2021-02-15 DIAGNOSIS — N40.1 BPH WITH OBSTRUCTION/LOWER URINARY TRACT SYMPTOMS: ICD-10-CM

## 2021-02-15 PROCEDURE — 3017F COLORECTAL CA SCREEN DOC REV: CPT | Performed by: PHYSICIAN ASSISTANT

## 2021-02-15 PROCEDURE — 1123F ACP DISCUSS/DSCN MKR DOCD: CPT | Performed by: PHYSICIAN ASSISTANT

## 2021-02-15 PROCEDURE — 99213 OFFICE O/P EST LOW 20 MIN: CPT | Performed by: PHYSICIAN ASSISTANT

## 2021-02-15 PROCEDURE — 1036F TOBACCO NON-USER: CPT | Performed by: PHYSICIAN ASSISTANT

## 2021-02-15 PROCEDURE — 51798 US URINE CAPACITY MEASURE: CPT | Performed by: PHYSICIAN ASSISTANT

## 2021-02-15 PROCEDURE — G8417 CALC BMI ABV UP PARAM F/U: HCPCS | Performed by: PHYSICIAN ASSISTANT

## 2021-02-15 PROCEDURE — G8484 FLU IMMUNIZE NO ADMIN: HCPCS | Performed by: PHYSICIAN ASSISTANT

## 2021-02-15 PROCEDURE — 4040F PNEUMOC VAC/ADMIN/RCVD: CPT | Performed by: PHYSICIAN ASSISTANT

## 2021-02-15 PROCEDURE — G8427 DOCREV CUR MEDS BY ELIG CLIN: HCPCS | Performed by: PHYSICIAN ASSISTANT

## 2021-02-15 ASSESSMENT — ENCOUNTER SYMPTOMS
EYE REDNESS: 0
SHORTNESS OF BREATH: 0
COLOR CHANGE: 0
VOMITING: 0
WHEEZING: 0
NAUSEA: 0
ABDOMINAL PAIN: 0
BACK PAIN: 0
CONSTIPATION: 0
COUGH: 0

## 2021-02-15 NOTE — PATIENT INSTRUCTIONS
SURVEY:    You may be receiving a survey from [employer] regarding your visit today. Please complete the survey to enable us to provide the highest quality of care to you and your family. If you cannot score us a very good on any question, please call the office to discuss how we could have made your experience a very good one. Thank you.       Your MA today: Joey Monzon

## 2021-02-15 NOTE — PROGRESS NOTES
HPI:      Patient is a 76 y.o. male in no acute distress. He is alert and oriented to person, place, and time. History  New patient referral from Dr. Tommy Rdz for elevated PSA of 4.46. PSA from 4/2019 was 3.12. Patient denies any family history of prostate cancer, breast cancer, or ovarian cancer. He denies unintentional weight loss, decreased energy or appetite, new or worsening bone/hip/back pain. He denies any lower extremity numbness and tingling. He denies new or worsening LUTS. He does have nocturia x2 and a weak stream. PVR 0ml. He denies gross hematuria or dysuria.      PSA  2/2021 - 3.33  8/2020 - 2.84 (after abx)  5/2020 - 4.46  4/2019 - 3.12  8/2018 - 2.88  7/2017 - 2.67    Today  Patient is here today for 6 month elevated PSA follow-up. Patient did have a recent PSA which was 3.33. This is up slightly from 6 months ago which was 2.84. He has no new or worsening lower urinary tract symptoms. Does have baseline nocturia x1-2. Is not bothersome for him. Denies any fever, chills, flank pain, gross hematuria, dysuria, nausea, vomiting. Denies unintentional weight loss or decreased appetite. Patient last voided 90 mins ago, scan = 101 mL. Past Medical History:   Diagnosis Date    History of DVT (deep vein thrombosis)     2016. massive PE. [facility] treatment    [facility] embol[facility] [address] [address]     Skin cancer of nose     Basal Cell     Past Surgical History:   Procedure Laterality Date    APPENDECTOMY      KNEE ARTHROSCOPY      right     Outpatient Encounter Medications as of 2/15/2021   Medication Sig Dispense Refill    atorvastatin (LIPITOR) 20 MG tablet Take 20 mg by mouth daily      amLODIPine (NORVASC) 5 MG tablet Take 1 tablet by mouth daily 30 tablet 3    apixaban (ELIQUIS) 5 MG TABS tablet Take 1 tablet by mouth 2 times daily 60 tablet 3     No facility-administered encounter medications on file as of 2/15/2021.        Current Outpatient Medications on File Prior to Visit Medication Sig Dispense Refill    atorvastatin (LIPITOR) 20 MG tablet Take 20 mg by mouth daily      amLODIPine (NORVASC) 5 MG tablet Take 1 tablet by mouth daily 30 tablet 3    apixaban (ELIQUIS) 5 MG TABS tablet Take 1 tablet by mouth 2 times daily 60 tablet 3     No current facility-administered medications on file prior to visit. Patient has no known allergies. Family History   Problem Relation Age of Onset    No Known Problems Father      Social History     Tobacco Use   Smoking Status Never Smoker   Smokeless Tobacco Never Used       Social History     Substance and Sexual Activity   Alcohol Use Yes    Comment: occ - social       Review of Systems   Constitutional: Negative for appetite change, chills and fever. Eyes: Negative for redness and visual disturbance. Respiratory: Negative for cough, shortness of breath and wheezing. Cardiovascular: Negative for chest pain and leg swelling. Gastrointestinal: Negative for abdominal pain, constipation, nausea and vomiting. Genitourinary: Negative for decreased urine volume, difficulty urinating, discharge, dysuria, enuresis, flank pain, frequency, hematuria, penile pain, scrotal swelling, testicular pain and urgency. Positive for nocturia x1-2   Musculoskeletal: Negative for back pain, joint swelling and myalgias. Skin: Negative for color change, rash and wound. Neurological: Negative for dizziness, tremors and numbness. Hematological: Negative for adenopathy. Does not bruise/bleed easily. BP (!) 178/88 (Site: Left Upper Arm, Position: Sitting, Cuff Size: Medium Adult) Comment: manual  Temp 97.5 °F (36.4 °C) (Temporal)   Wt 187 lb (84.8 kg)   BMI 27.62 kg/m²       PHYSICAL EXAM:  Constitutional: Patient in no acute distress; Neuro: alert and oriented to person place and time.     Psych: Mood and affect normal.  Lungs: Respiratory effort normal  Abdomen: Soft, non-tender, non-distended  Rectal: Deferred      Lab Results

## 2021-02-23 ENCOUNTER — TELEPHONE (OUTPATIENT)
Dept: SURGERY | Age: 66
End: 2021-02-23

## 2021-02-23 NOTE — TELEPHONE ENCOUNTER
Writer spoke to Ty Alvarado to inform him that we got medical clearance from Dr. Paul Francois and permission to hold Eliquis for 3 days prior to his colonoscopy on 3/22/2021. He voiced understanding.

## 2021-03-10 ENCOUNTER — TELEPHONE (OUTPATIENT)
Dept: SURGERY | Age: 66
End: 2021-03-10

## 2021-03-10 NOTE — TELEPHONE ENCOUNTER
Patient called to cancel colonoscopy scheduled for 3/22/21. Surgery notified. Patient does not wish to reschedule at this time.

## 2021-12-21 ENCOUNTER — TELEPHONE (OUTPATIENT)
Dept: UROLOGY | Age: 66
End: 2021-12-21

## 2022-02-16 ENCOUNTER — TELEPHONE (OUTPATIENT)
Dept: UROLOGY | Age: 67
End: 2022-02-16

## 2022-02-16 DIAGNOSIS — R97.20 ELEVATED PSA: Primary | ICD-10-CM

## 2022-02-16 NOTE — TELEPHONE ENCOUNTER
Left message for patient to return call regarding rescheduling cancelled appt and completing PSA.  Updated PSA order needed